# Patient Record
(demographics unavailable — no encounter records)

---

## 2025-02-25 NOTE — ASSESSMENT
[FreeTextEntry1] : Mr. Adin Collier is a 46-year-old hernández  who presents with 3 complaints: 1.  Urinary hesitancy 2.  Sexual dysfunction 3.  Decreased libido.  He has taken that sildenafil in the past with good results regarding his erectile function.  He has undergone endocrinologic studies by Dr. Adin Garcia.  These were reviewed : prolactin 7.3 PSA 1.34 creat 0.77  free T 64.2 LH 3.3 FSH 3.7 MRI ? cyst vs small pituitary microadenoma NEEDS ENT consult  His urine flow rate demonstrates a flow of 14 cc/s with a good shaped curve.  His voided volume was 150 cc.  He had no significant residual.  We discussed the impact of erectile dysfunction on libido.  We discussed the physiology of libido.  We discussed the fact that he responded well to sildenafil in the past and therefore treating his urinary symptoms and his sexual symptoms with daily tadalafil (2.5 mg) and address his 2 primary concerns.  We discussed the risks and side effects of Cialis including priapism. Warned re priapism risk and need for immediate intervention if erection lasts more than 2 hours.  Patient instructed to report to an emergency room and explicitly inform staff of his condition  and need for treatment.  We discussed his posthitis.  He is not interested in circumcision.  We discussed conservative management.  He was provided with nystatin cream to utilize on a as needed basis.  Lastly his MRI report was reviewed.  His prolactin level was normal in spite of the question of a question microadenoma versus a cyst.  Also of note was the fact that he had tonsillar thickening and ENT evaluation was recommended.  I emphasized the importance of this.  I instructed him to discuss this with Dr. Garcia for recommendation. The ADIN COLLIER  expressed fully understanding of the information provided, the consequences and the management.  Plan: 1. tadalafil 2.5 mg daily 2. fu with Dr Jose grajeda ENT recommendation 3. fu in 6 weeks  1.31.23 continues tadalafil 2.5mg with improved erections continues to have hesitancy, not bothersome no side effects  did not follow up with Dr. Jose grajeda ENT tonsillar thickening continues to have posthitis  IPSS 8 LETHA 18  discussed increasing tadalafil dose to 5 mg, but not necessary in this setting discussed staying course 2.5 mg to avoid side effects discussed foreskin hygiene patient not willing to proceed with circumcision discussed importance of ENT referral endocrinology noted no significant findings  plan: 1. continue tadalafil 2.5mg 2. continue nystatin 2. refer to Dr. Rai Magaña ENT 3. f/u in 6 months with flow/PVR  8.8.2023 tadalafil 2.5 mg, improved erections hesitancy resolved evaluated by Dr. Magaña, normal exam IPSS 4 LETHA 22  reviewed labs in November 2022 normal endocrine labs continues to not be interested in circumcision discussed repeating prolactin to reassess pituitary abnormality  plan: continue tadalafil 2.5 mg prolactin f/u in 6 months  2.6.2024 s/p ENT evaluation history of posthitis responding to tadalafil 2.5 mg IPSS 7 - pleased ED responding  to tadalafil LETHA 22 Plan: renewal of tadalafil 2.5 mg followup in 6 months  8.13.2024 Patient returns on tadalafil 2.5 mg.  He states his erections are "okay".  His urinary hesitancy has returned.  His postvoid residual was 38 cc.  His flow was nondiagnostic.  We discussed increasing the dose of tadalafil to 5 mg versus continuing at 2.5 mg.  Patient prefers to increase the dose to 5 mg daily.  We reviewed his previous records.  He did undergo evaluation for the question pituitary abnormality and tonsillar thickening.  Plan: 1.  Tadalafil 5 mg daily 2.  Urinalysis 3.  Prolactin 4.  Follow-up in 6 months or as needed  2.25.2025 complaining of interrupted stream and incomplete difficulty urinating on command Patient continues to complain of feeling of inadequate or incomplete voiding.  His flow was excellent.  However he does have a sawtooth pattern.  His postvoid residual is 94 cc.  We discussed options of: 1.  Pelvic floor rehab for possible pelvic floor dysfunction versus 2.  Stopping tadalafil on a daily basis and replacing it with alfuzosin.  At this point he is infrequently sexually active.  He is not certain that he needs to tadalafil for sexual function.  Plan: 1.  Alfuzosin Pt understands that some of the most common SE of this medication include dizziness, dry mouth, fatigue, lightheadedness, palpitations. Pt informed to stop the medication should any of these occur.  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  Tadalafil as needed for sexual function  (discussed interval between medications) 3.  Follow-up in 1 month with flow and postvoid residual 4.  PSA Discussed PSA monitoring and controversy.  Patient wants to proceed, 5.  Testosterone and prolactin

## 2025-02-25 NOTE — HISTORY OF PRESENT ILLNESS
[FreeTextEntry1] : 46 year old   hernández single sexually active; utilizes condoms referred by Dr Adin Garcia re: 1. decreased libido  2. decreased quality of erection 3. urinary hesitancy  1.31.23 returns re sexual dysfunction tadalafil 2.5 mg good response hesitancy did not see ENT as recommended  8.8.2023 returns re sexual dysfunction tadalafil 2.5 mg good response urinary hesitancy resolved  8.13.2024 returns on tadalafil 2.5 mg occ hesitancy feeling of incomplete emptying flow nondiagnostic no dysuria IPSS 7 LETHA 17  2.25.2025 returns on tadalafil 5 mg LUTS ok  flow not as stong with incomplete emptying at time (frequent) IPSS 12 letha 17

## 2025-02-25 NOTE — PHYSICAL EXAM
[Penis Abnormality] : normal uncircumcised penis [Chaperone Present] : A chaperone was present in the examining room during all aspects of the physical examination [de-identified] : no evidence of posthitis flow 27.3 cc/sec; sawtooth; pvr 94 [FreeTextEntry2] : Montana Guillen , Rad Tech

## 2025-04-01 NOTE — ASSESSMENT
[FreeTextEntry1] : Mr. Adin Collier is a 46-year-old hernández  who presents with 3 complaints: 1.  Urinary hesitancy 2.  Sexual dysfunction 3.  Decreased libido.  He has taken that sildenafil in the past with good results regarding his erectile function.  He has undergone endocrinologic studies by Dr. Adin Garcia.  These were reviewed : prolactin 7.3 PSA 1.34 creat 0.77  free T 64.2 LH 3.3 FSH 3.7 MRI ? cyst vs small pituitary microadenoma NEEDS ENT consult  His urine flow rate demonstrates a flow of 14 cc/s with a good shaped curve.  His voided volume was 150 cc.  He had no significant residual.  We discussed the impact of erectile dysfunction on libido.  We discussed the physiology of libido.  We discussed the fact that he responded well to sildenafil in the past and therefore treating his urinary symptoms and his sexual symptoms with daily tadalafil (2.5 mg) and address his 2 primary concerns.  We discussed the risks and side effects of Cialis including priapism. Warned re priapism risk and need for immediate intervention if erection lasts more than 2 hours.  Patient instructed to report to an emergency room and explicitly inform staff of his condition  and need for treatment.  We discussed his posthitis.  He is not interested in circumcision.  We discussed conservative management.  He was provided with nystatin cream to utilize on a as needed basis.  Lastly his MRI report was reviewed.  His prolactin level was normal in spite of the question of a question microadenoma versus a cyst.  Also of note was the fact that he had tonsillar thickening and ENT evaluation was recommended.  I emphasized the importance of this.  I instructed him to discuss this with Dr. Garcia for recommendation. The ADIN COLLIER  expressed fully understanding of the information provided, the consequences and the management.  Plan: 1. tadalafil 2.5 mg daily 2. fu with Dr Jose grajeda ENT recommendation 3. fu in 6 weeks  .23 continues tadalafil 2.5mg with improved erections continues to have hesitancy, not bothersome no side effects  did not follow up with Dr. Jose grajeda ENT tonsillar thickening continues to have posthitis  IPSS 8 LETHA 18  discussed increasing tadalafil dose to 5 mg, but not necessary in this setting discussed staying course 2.5 mg to avoid side effects discussed foreskin hygiene patient not willing to proceed with circumcision discussed importance of ENT referral endocrinology noted no significant findings  plan: 1. continue tadalafil 2.5mg 2. continue nystatin 2. refer to Dr. Rai Magaña ENT 3. f/u in 6 months with flow/PVR  8. tadalafil 2.5 mg, improved erections hesitancy resolved evaluated by Dr. Magaña, normal exam IPSS 4 LETHA 22  reviewed labs in 2022 normal endocrine labs continues to not be interested in circumcision discussed repeating prolactin to reassess pituitary abnormality  plan: continue tadalafil 2.5 mg prolactin f/u in 6 months  2. s/p ENT evaluation history of posthitis responding to tadalafil 2.5 mg IPSS 7 - pleased ED responding  to tadalafil LETHA 22 Plan: renewal of tadalafil 2.5 mg followup in 6 months  8. Patient returns on tadalafil 2.5 mg.  He states his erections are "okay".  His urinary hesitancy has returned.  His postvoid residual was 38 cc.  His flow was nondiagnostic.  We discussed increasing the dose of tadalafil to 5 mg versus continuing at 2.5 mg.  Patient prefers to increase the dose to 5 mg daily.  We reviewed his previous records.  He did undergo evaluation for the question pituitary abnormality and tonsillar thickening.  Plan: 1.  Tadalafil 5 mg daily 2.  Urinalysis 3.  Prolactin 4.  Follow-up in 6 months or as needed  2. complaining of interrupted stream and incomplete difficulty urinating on command Patient continues to complain of feeling of inadequate or incomplete voiding.  His flow was excellent.  However he does have a sawtooth pattern.  His postvoid residual is 94 cc.  We discussed options of: 1.  Pelvic floor rehab for possible pelvic floor dysfunction versus 2.  Stopping tadalafil on a daily basis and replacing it with alfuzosin.  At this point he is infrequently sexually active.  He is not certain that he needs to tadalafil for sexual function.  Plan: 1.  Alfuzosin Pt understands that some of the most common SE of this medication include dizziness, dry mouth, fatigue, lightheadedness, palpitations. Pt informed to stop the medication should any of these occur.  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  Tadalafil as needed for sexual function  (discussed interval between medications) 3.  Follow-up in 1 month with flow and postvoid residual 4.  PSA Discussed PSA monitoring and controversy.  Patient wants to proceed, 5.  Testosterone and prolactin  4.1. PSA 1.35 discussed value and need to monitor repeat in 6 months  T 380 urinalysis normal repeat T this Am  LUTS no change on alfuzosin flow 152.; volume 432.5  Saw tooth pattern  PVR 81 cc I discussed with ADIN COLLIER the possible indications for treatment of prostatic obstruction includin urinary symptoms and quality of life issues 2.  Obstruction and urinary retention 3.  Infections 4.  Bladder stones 5.  Upper tract changes including hydronephrosis and renal dysfunction We discussed the indications in his case. Discussed risk and complications of cystoscopy  Plan: 1. testosterone 2. cystoscopy r/o stricture 3. continue uroxatal

## 2025-04-22 NOTE — ASSESSMENT
[FreeTextEntry1] : Mr. Adin Collier is a 46-year-old hernández  who presents with 3 complaints: 1.  Urinary hesitancy 2.  Sexual dysfunction 3.  Decreased libido.  He has taken that sildenafil in the past with good results regarding his erectile function.  He has undergone endocrinologic studies by Dr. Adin Garcia.  These were reviewed : prolactin 7.3 PSA 1.34 creat 0.77  free T 64.2 LH 3.3 FSH 3.7 MRI ? cyst vs small pituitary microadenoma NEEDS ENT consult  His urine flow rate demonstrates a flow of 14 cc/s with a good shaped curve.  His voided volume was 150 cc.  He had no significant residual.  We discussed the impact of erectile dysfunction on libido.  We discussed the physiology of libido.  We discussed the fact that he responded well to sildenafil in the past and therefore treating his urinary symptoms and his sexual symptoms with daily tadalafil (2.5 mg) and address his 2 primary concerns.  We discussed the risks and side effects of Cialis including priapism. Warned re priapism risk and need for immediate intervention if erection lasts more than 2 hours.  Patient instructed to report to an emergency room and explicitly inform staff of his condition  and need for treatment.  We discussed his posthitis.  He is not interested in circumcision.  We discussed conservative management.  He was provided with nystatin cream to utilize on a as needed basis.  Lastly his MRI report was reviewed.  His prolactin level was normal in spite of the question of a question microadenoma versus a cyst.  Also of note was the fact that he had tonsillar thickening and ENT evaluation was recommended.  I emphasized the importance of this.  I instructed him to discuss this with Dr. Garcia for recommendation. The ADIN COLLIER  expressed fully understanding of the information provided, the consequences and the management.  Plan: 1. tadalafil 2.5 mg daily 2. fu with Dr Jose grajeda ENT recommendation 3. fu in 6 weeks  .23 continues tadalafil 2.5mg with improved erections continues to have hesitancy, not bothersome no side effects  did not follow up with Dr. Jose grajeda ENT tonsillar thickening continues to have posthitis  IPSS 8 LETHA 18  discussed increasing tadalafil dose to 5 mg, but not necessary in this setting discussed staying course 2.5 mg to avoid side effects discussed foreskin hygiene patient not willing to proceed with circumcision discussed importance of ENT referral endocrinology noted no significant findings  plan: 1. continue tadalafil 2.5mg 2. continue nystatin 2. refer to Dr. Rai Magaña ENT 3. f/u in 6 months with flow/PVR  8. tadalafil 2.5 mg, improved erections hesitancy resolved evaluated by Dr. Magaña, normal exam IPSS 4 LETHA 22  reviewed labs in 2022 normal endocrine labs continues to not be interested in circumcision discussed repeating prolactin to reassess pituitary abnormality  plan: continue tadalafil 2.5 mg prolactin f/u in 6 months  2. s/p ENT evaluation history of posthitis responding to tadalafil 2.5 mg IPSS 7 - pleased ED responding  to tadalafil LETHA 22 Plan: renewal of tadalafil 2.5 mg followup in 6 months  8. Patient returns on tadalafil 2.5 mg.  He states his erections are "okay".  His urinary hesitancy has returned.  His postvoid residual was 38 cc.  His flow was nondiagnostic.  We discussed increasing the dose of tadalafil to 5 mg versus continuing at 2.5 mg.  Patient prefers to increase the dose to 5 mg daily.  We reviewed his previous records.  He did undergo evaluation for the question pituitary abnormality and tonsillar thickening.  Plan: 1.  Tadalafil 5 mg daily 2.  Urinalysis 3.  Prolactin 4.  Follow-up in 6 months or as needed  2. complaining of interrupted stream and incomplete difficulty urinating on command Patient continues to complain of feeling of inadequate or incomplete voiding.  His flow was excellent.  However he does have a sawtooth pattern.  His postvoid residual is 94 cc.  We discussed options of: 1.  Pelvic floor rehab for possible pelvic floor dysfunction versus 2.  Stopping tadalafil on a daily basis and replacing it with alfuzosin.  At this point he is infrequently sexually active.  He is not certain that he needs to tadalafil for sexual function.  Plan: 1.  Alfuzosin Pt understands that some of the most common SE of this medication include dizziness, dry mouth, fatigue, lightheadedness, palpitations. Pt informed to stop the medication should any of these occur.  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  Tadalafil as needed for sexual function  (discussed interval between medications) 3.  Follow-up in 1 month with flow and postvoid residual 4.  PSA Discussed PSA monitoring and controversy.  Patient wants to proceed, 5.  Testosterone and prolactin  4. PSA 1.35 discussed value and need to monitor repeat in 6 months  T 380 urinalysis normal repeat T this Am  LUTS no change on alfuzosin flow 152.; volume 432.5  Saw tooth pattern  PVR 81 cc I discussed with ADIN COLLIER the possible indications for treatment of prostatic obstruction includin urinary symptoms and quality of life issues 2.  Obstruction and urinary retention 3.  Infections 4.  Bladder stones 5.  Upper tract changes including hydronephrosis and renal dysfunction We discussed the indications in his case. Discussed risk and complications of cystoscopy  Plan: 1. testosterone 2. cystoscopy r/o stricture 3. continue uroxatal  4. cystoscopy no stricture; mild bilobar obstrcution continue alfuzosin ? no improvement  felt better on tadalafil 5 mg discussed UDS to assess prostate obtstruction patient wants to restart tadalafil 5 mg  low libido low T on 2 occassions T 194 T 277 prolactin 9.3   We had an extensive discussion re Risks of T replacement; Patient was informed about Black box warning from FDA. We discussed recent data regarding increased risk of coronary plaque size, heart disease; thrombolic event; increased Hbg/Hct, breast tenderness; acne; irritability; sleep apnea and increased urinary symptoms; effect on testicular function including suppression of spermatogenesis; hair loss (male pattern baldness) effect on LFTS; effect on prostate cancer. Different treatment approaches were discussed including IM, transdermal and Testopel We discussed advantages and disadvantages of each We discussed risk to T exposure to partners and children from transdermal appoaches. Patient choses to proceed with transdermal T replacement  Discussed clomid as option I discussed Clomid physiologic basis withADIN COLLIER  We discussed the utilization of Clomid in the treatment of his primary complaint We discussed potential side effects of Clomid  in relation to physiologic effects and adverse effects. We specifically reviewed  acne, hair loss, gynecomastia, visual disturbance, signs and symptoms of "puberty" We discussed that utilization and the fact the this is not an FDA approved utilization. The dose regimen was reviewed. 1/2 tablet qd We discussed the need to monitor the physiologic hormonal response. We discussed repeat labs and followup thereafter. discussed clomid vs T  Plan referral for UDS (Danielle) clomid 25 mg qd labs in 3 weeks

## 2025-04-22 NOTE — HISTORY OF PRESENT ILLNESS
[FreeTextEntry1] : 46 year old   hernández single sexually active; utilizes condoms referred by Dr Adin Garcia re: 1. decreased libido  2. decreased quality of erection 3. urinary hesitancy  1.31.23 returns re sexual dysfunction tadalafil 2.5 mg good response hesitancy did not see ENT as recommended  8.8.2023 returns re sexual dysfunction tadalafil 2.5 mg good response urinary hesitancy resolved  8.13.2024 returns on tadalafil 2.5 mg occ hesitancy feeling of incomplete emptying flow nondiagnostic no dysuria IPSS 7 LETHA 17  2.25.2025 returns on tadalafil 5 mg LUTS ok  flow not as stong with incomplete emptying at time (frequent) IPSS 12 letha 17   4.22.2025 returns for cystscopy returns for management of Low T

## 2025-05-20 NOTE — HISTORY OF PRESENT ILLNESS
[FreeTextEntry1] : 46 year old   hernández single sexually active; utilizes condoms referred by Dr Adin Garcia re: 1. decreased libido  2. decreased quality of erection 3. urinary hesitancy  1.31.23 returns re sexual dysfunction tadalafil 2.5 mg good response hesitancy did not see ENT as recommended  8.8.2023 returns re sexual dysfunction tadalafil 2.5 mg good response urinary hesitancy resolved  8.13.2024 returns on tadalafil 2.5 mg occ hesitancy feeling of incomplete emptying flow nondiagnostic no dysuria IPSS 7 LETHA 17  2.25.2025 returns on tadalafil 5 mg LUTS ok  flow not as stong with incomplete emptying at time (frequent) IPSS 12 letha 17   4.22.2025 returns for cystoscopy returns for management of Low T

## 2025-05-20 NOTE — ASSESSMENT
[FreeTextEntry1] : Mr. Adin Collier is a 46-year-old hernández  who presents with 3 complaints: 1.  Urinary hesitancy 2.  Sexual dysfunction 3.  Decreased libido.  He has taken that sildenafil in the past with good results regarding his erectile function.  He has undergone endocrinologic studies by Dr. Adin Garcia.  These were reviewed : prolactin 7.3 PSA 1.34 creat 0.77  free T 64.2 LH 3.3 FSH 3.7 MRI ? cyst vs small pituitary microadenoma NEEDS ENT consult  His urine flow rate demonstrates a flow of 14 cc/s with a good shaped curve.  His voided volume was 150 cc.  He had no significant residual.  We discussed the impact of erectile dysfunction on libido.  We discussed the physiology of libido.  We discussed the fact that he responded well to sildenafil in the past and therefore treating his urinary symptoms and his sexual symptoms with daily tadalafil (2.5 mg) and address his 2 primary concerns.  We discussed the risks and side effects of Cialis including priapism. Warned re priapism risk and need for immediate intervention if erection lasts more than 2 hours.  Patient instructed to report to an emergency room and explicitly inform staff of his condition  and need for treatment.  We discussed his posthitis.  He is not interested in circumcision.  We discussed conservative management.  He was provided with nystatin cream to utilize on a as needed basis.  Lastly his MRI report was reviewed.  His prolactin level was normal in spite of the question of a question microadenoma versus a cyst.  Also of note was the fact that he had tonsillar thickening and ENT evaluation was recommended.  I emphasized the importance of this.  I instructed him to discuss this with Dr. Garcia for recommendation. The ADIN COLLIER  expressed fully understanding of the information provided, the consequences and the management.  Plan: 1. tadalafil 2.5 mg daily 2. fu with Dr Jose grajeda ENT recommendation 3. fu in 6 weeks  .23 continues tadalafil 2.5mg with improved erections continues to have hesitancy, not bothersome no side effects  did not follow up with Dr. Jose grajeda ENT tonsillar thickening continues to have posthitis  IPSS 8 LETHA 18  discussed increasing tadalafil dose to 5 mg, but not necessary in this setting discussed staying course 2.5 mg to avoid side effects discussed foreskin hygiene patient not willing to proceed with circumcision discussed importance of ENT referral endocrinology noted no significant findings  plan: 1. continue tadalafil 2.5mg 2. continue nystatin 2. refer to Dr. Rai Magaña ENT 3. f/u in 6 months with flow/PVR  8. tadalafil 2.5 mg, improved erections hesitancy resolved evaluated by Dr. Magaña, normal exam IPSS 4 LETHA 22  reviewed labs in 2022 normal endocrine labs continues to not be interested in circumcision discussed repeating prolactin to reassess pituitary abnormality  plan: continue tadalafil 2.5 mg prolactin f/u in 6 months  2. s/p ENT evaluation history of posthitis responding to tadalafil 2.5 mg IPSS 7 - pleased ED responding  to tadalafil LETHA 22 Plan: renewal of tadalafil 2.5 mg followup in 6 months  8. Patient returns on tadalafil 2.5 mg.  He states his erections are "okay".  His urinary hesitancy has returned.  His postvoid residual was 38 cc.  His flow was nondiagnostic.  We discussed increasing the dose of tadalafil to 5 mg versus continuing at 2.5 mg.  Patient prefers to increase the dose to 5 mg daily.  We reviewed his previous records.  He did undergo evaluation for the question pituitary abnormality and tonsillar thickening.  Plan: 1.  Tadalafil 5 mg daily 2.  Urinalysis 3.  Prolactin 4.  Follow-up in 6 months or as needed  2. complaining of interrupted stream and incomplete difficulty urinating on command Patient continues to complain of feeling of inadequate or incomplete voiding.  His flow was excellent.  However he does have a sawtooth pattern.  His postvoid residual is 94 cc.  We discussed options of: 1.  Pelvic floor rehab for possible pelvic floor dysfunction versus 2.  Stopping tadalafil on a daily basis and replacing it with alfuzosin.  At this point he is infrequently sexually active.  He is not certain that he needs to tadalafil for sexual function.  Plan: 1.  Alfuzosin Pt understands that some of the most common SE of this medication include dizziness, dry mouth, fatigue, lightheadedness, palpitations. Pt informed to stop the medication should any of these occur.  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  Tadalafil as needed for sexual function  (discussed interval between medications) 3.  Follow-up in 1 month with flow and postvoid residual 4.  PSA Discussed PSA monitoring and controversy.  Patient wants to proceed, 5.  Testosterone and prolactin  4. PSA 1.35 discussed value and need to monitor repeat in 6 months  T 380 urinalysis normal repeat T this Am  LUTS no change on alfuzosin flow 152.; volume 432.5  Saw tooth pattern  PVR 81 cc I discussed with ADIN COLLIER the possible indications for treatment of prostatic obstruction includin urinary symptoms and quality of life issues 2.  Obstruction and urinary retention 3.  Infections 4.  Bladder stones 5.  Upper tract changes including hydronephrosis and renal dysfunction We discussed the indications in his case. Discussed risk and complications of cystoscopy  Plan: 1. testosterone 2. cystoscopy r/o stricture 3. continue uroxatal  4. cystoscopy no stricture; mild bilobar obstrcution continue alfuzosin ? no improvement  felt better on tadalafil 5 mg discussed UDS to assess prostate obtstruction patient wants to restart tadalafil 5 mg  low libido low T on 2 occassions T 194 T 277 prolactin 9.3   We had an extensive discussion re Risks of T replacement; Patient was informed about Black box warning from FDA. We discussed recent data regarding increased risk of coronary plaque size, heart disease; thrombolic event; increased Hbg/Hct, breast tenderness; acne; irritability; sleep apnea and increased urinary symptoms; effect on testicular function including suppression of spermatogenesis; hair loss (male pattern baldness) effect on LFTS; effect on prostate cancer. Different treatment approaches were discussed including IM, transdermal and Testopel We discussed advantages and disadvantages of each We discussed risk to T exposure to partners and children from transdermal appoaches. Patient choses to proceed with transdermal T replacement  Discussed clomid as option I discussed Clomid physiologic basis withADIN COLLIER  We discussed the utilization of Clomid in the treatment of his primary complaint We discussed potential side effects of Clomid  in relation to physiologic effects and adverse effects. We specifically reviewed  acne, hair loss, gynecomastia, visual disturbance, signs and symptoms of "puberty" We discussed that utilization and the fact the this is not an FDA approved utilization. The dose regimen was reviewed. 1/2 tablet qd We discussed the need to monitor the physiologic hormonal response. We discussed repeat labs and followup thereafter. discussed clomid vs T  Plan referral for UDS (Danielle) clomid 25 mg qd labs in 3 weeks   2025 returns had been on clomid x 30 days then ran out of  no change in sexual function no change in libido continues to take tadalafil  5 mg daily excellent erections  Labs reviewed on T excellent response hormonal discussed continuing on clomid x 3 months clomid 25 mg daily  LUTS off alfuzosin continue tadalafil 5mg patient has not seen Dr Fontana  scheduled for  discussed UDS procedure and indications  The ADIN COLLIER  expressed fully understanding of the information provided, the consequences and the management.  Plan: resume clomid labs in 60 days assess symptomatic response re energy /libido etc followup Dr Fontana re HAMMONDS vs OAB continue daily tadalafil The ADIN COLLIER  expressed fully understanding of the information provided, the consequences and the management.

## 2025-06-22 NOTE — HISTORY OF PRESENT ILLNESS
[FreeTextEntry1] : 48-year-old gentleman with urinary hesitancy, sensation of incomplete emptying, intermittency, and slow stream who is failed daily tadalafil as well as alpha-blocker therapy. The patient is here today to have urodynamics performed, review results and discuss treatment options. Denies significant change in med/surg history since last office visit.  Cystoscopy with Dr. Nixon revealed mild bilateral lobar obstruction.  UDS -   Filling/Storage Phase: First sensation 19 mL, First desire 96 mL, Normal desire 102 mL and Cystometric capacity 212 mL. Involuntary contractions were not present . Pt did not demonstrate stress urinary incontinence. Pt did not demonstrate urge urinary incontinence. Compliance: normal. EMG Activity: normal.    Voiding Phase: Qmax 8.9 mL/s , Pdet at Qmax 98.7 cm/H20, Qmax at Pdet 6.8 mL/s, Pavg 82 cm/H20, Qavg 5 mL/s, voiding time 59.6 mm:sec, voided volume 265.4 mL and post void residual 0 mL. EMG activity was synergic.   Additional Comments: BOOI - 82.   Urodynamic Interpretation :   Normal bladder sensation. Decreased bladder capacity. Patient experiencing no detrusor instability. The uroflow rate is decreased. The uroflow pattern is plateaued. The detrusor contractility is normal. The patient has bladder outlet obstruction. The intravesical voiding pressure is increased. The patient has complete bladder emptying. The spincter activity  is normal synergic.

## 2025-06-22 NOTE — ASSESSMENT
[FreeTextEntry1] :   Impression/plan: 48-year-old gentleman with LUTS symptoms, not responding to med therapy, fitting the picture of potential primary bladder neck obstruction.  Urodynamics did reveal high pressure voiding with low flow.  UDS reviewed, shows low flow/high pressure, BOOI 82 c/w outlet obstruction. We reviewed the surgical options at length, will be partially dependent on prostate volume. All questions answered.

## 2025-06-22 NOTE — PHYSICAL EXAM
[General Appearance - Well Developed] : well developed [General Appearance - Well Nourished] : well nourished [Normal Appearance] : normal appearance [Well Groomed] : well groomed [] : no respiratory distress [General Appearance - In No Acute Distress] : no acute distress [Respiration, Rhythm And Depth] : normal respiratory rhythm and effort [Exaggerated Use Of Accessory Muscles For Inspiration] : no accessory muscle use [Normal Station and Gait] : the gait and station were normal for the patient's age [Oriented To Time, Place, And Person] : oriented to person, place, and time

## 2025-07-28 NOTE — HISTORY OF PRESENT ILLNESS
[FreeTextEntry1] : 46 year old   hernández single sexually active; utilizes condoms referred by Dr Adin Garcia re: 1. decreased libido  2. decreased quality of erection 3. urinary hesitancy  1.31.23 returns re sexual dysfunction tadalafil 2.5 mg good response hesitancy did not see ENT as recommended  8.8.2023 returns re sexual dysfunction tadalafil 2.5 mg good response urinary hesitancy resolved  8.13.2024 returns on tadalafil 2.5 mg occ hesitancy feeling of incomplete emptying flow nondiagnostic no dysuria IPSS 7 LETHA 17  2.25.2025 returns on tadalafil 5 mg LUTS ok  flow not as strong with incomplete emptying at time (frequent) IPSS 12 letha 17   4.22.2025 returns for cystoscopy returns for management of Low T  7.28.2025 Patient returns after urodynamic evaluation

## 2025-07-28 NOTE — ASSESSMENT
[FreeTextEntry1] : Mr. Adin Collier is a 46-year-old hernández  who presents with 3 complaints: 1.  Urinary hesitancy 2.  Sexual dysfunction 3.  Decreased libido.  He has taken that sildenafil in the past with good results regarding his erectile function.  He has undergone endocrinologic studies by Dr. Adin Garcia.  These were reviewed : prolactin 7.3 PSA 1.34 creat 0.77  free T 64.2 LH 3.3 FSH 3.7 MRI ? cyst vs small pituitary microadenoma NEEDS ENT consult  His urine flow rate demonstrates a flow of 14 cc/s with a good shaped curve.  His voided volume was 150 cc.  He had no significant residual.  We discussed the impact of erectile dysfunction on libido.  We discussed the physiology of libido.  We discussed the fact that he responded well to sildenafil in the past and therefore treating his urinary symptoms and his sexual symptoms with daily tadalafil (2.5 mg) and address his 2 primary concerns.  We discussed the risks and side effects of Cialis including priapism. Warned re priapism risk and need for immediate intervention if erection lasts more than 2 hours.  Patient instructed to report to an emergency room and explicitly inform staff of his condition  and need for treatment.  We discussed his posthitis.  He is not interested in circumcision.  We discussed conservative management.  He was provided with nystatin cream to utilize on a as needed basis.  Lastly his MRI report was reviewed.  His prolactin level was normal in spite of the question of a question microadenoma versus a cyst.  Also of note was the fact that he had tonsillar thickening and ENT evaluation was recommended.  I emphasized the importance of this.  I instructed him to discuss this with Dr. Garcia for recommendation. The ADIN COLLIER  expressed fully understanding of the information provided, the consequences and the management.  Plan: 1. tadalafil 2.5 mg daily 2. fu with Dr Jose grajeda ENT recommendation 3. fu in 6 weeks  .23 continues tadalafil 2.5mg with improved erections continues to have hesitancy, not bothersome no side effects  did not follow up with Dr. Jose grajeda ENT tonsillar thickening continues to have posthitis  IPSS 8 LETHA 18  discussed increasing tadalafil dose to 5 mg, but not necessary in this setting discussed staying course 2.5 mg to avoid side effects discussed foreskin hygiene patient not willing to proceed with circumcision discussed importance of ENT referral endocrinology noted no significant findings  plan: 1. continue tadalafil 2.5mg 2. continue nystatin 2. refer to Dr. Rai Magaña ENT 3. f/u in 6 months with flow/PVR  8. tadalafil 2.5 mg, improved erections hesitancy resolved evaluated by Dr. Magaña, normal exam IPSS 4 LETHA 22  reviewed labs in 2022 normal endocrine labs continues to not be interested in circumcision discussed repeating prolactin to reassess pituitary abnormality  plan: continue tadalafil 2.5 mg prolactin f/u in 6 months  2. s/p ENT evaluation history of posthitis responding to tadalafil 2.5 mg IPSS 7 - pleased ED responding  to tadalafil LEHTA 22 Plan: renewal of tadalafil 2.5 mg followup in 6 months  8. Patient returns on tadalafil 2.5 mg.  He states his erections are "okay".  His urinary hesitancy has returned.  His postvoid residual was 38 cc.  His flow was nondiagnostic.  We discussed increasing the dose of tadalafil to 5 mg versus continuing at 2.5 mg.  Patient prefers to increase the dose to 5 mg daily.  We reviewed his previous records.  He did undergo evaluation for the question pituitary abnormality and tonsillar thickening.  Plan: 1.  Tadalafil 5 mg daily 2.  Urinalysis 3.  Prolactin 4.  Follow-up in 6 months or as needed  2. complaining of interrupted stream and incomplete difficulty urinating on command Patient continues to complain of feeling of inadequate or incomplete voiding.  His flow was excellent.  However he does have a sawtooth pattern.  His postvoid residual is 94 cc.  We discussed options of: 1.  Pelvic floor rehab for possible pelvic floor dysfunction versus 2.  Stopping tadalafil on a daily basis and replacing it with alfuzosin.  At this point he is infrequently sexually active.  He is not certain that he needs to tadalafil for sexual function.  Plan: 1.  Alfuzosin Pt understands that some of the most common SE of this medication include dizziness, dry mouth, fatigue, lightheadedness, palpitations. Pt informed to stop the medication should any of these occur.  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  Tadalafil as needed for sexual function  (discussed interval between medications) 3.  Follow-up in 1 month with flow and postvoid residual 4.  PSA Discussed PSA monitoring and controversy.  Patient wants to proceed, 5.  Testosterone and prolactin  4. PSA 1.35 discussed value and need to monitor repeat in 6 months  T 380 urinalysis normal repeat T this Am  LUTS no change on alfuzosin flow 152.; volume 432.5  Saw tooth pattern  PVR 81 cc I discussed with ADIN COLLIER the possible indications for treatment of prostatic obstruction includin urinary symptoms and quality of life issues 2.  Obstruction and urinary retention 3.  Infections 4.  Bladder stones 5.  Upper tract changes including hydronephrosis and renal dysfunction We discussed the indications in his case. Discussed risk and complications of cystoscopy  Plan: 1. testosterone 2. cystoscopy r/o stricture 3. continue uroxatal  4. cystoscopy no stricture; mild bilobar obstrcution continue alfuzosin ? no improvement  felt better on tadalafil 5 mg discussed UDS to assess prostate obtstruction patient wants to restart tadalafil 5 mg  low libido low T on 2 occassions T 194 T 277 prolactin 9.3   We had an extensive discussion re Risks of T replacement; Patient was informed about Black box warning from FDA. We discussed recent data regarding increased risk of coronary plaque size, heart disease; thrombolic event; increased Hbg/Hct, breast tenderness; acne; irritability; sleep apnea and increased urinary symptoms; effect on testicular function including suppression of spermatogenesis; hair loss (male pattern baldness) effect on LFTS; effect on prostate cancer. Different treatment approaches were discussed including IM, transdermal and Testopel We discussed advantages and disadvantages of each We discussed risk to T exposure to partners and children from transdermal appoaches. Patient choses to proceed with transdermal T replacement  Discussed clomid as option I discussed Clomid physiologic basis withADIN COLLIER  We discussed the utilization of Clomid in the treatment of his primary complaint We discussed potential side effects of Clomid  in relation to physiologic effects and adverse effects. We specifically reviewed  acne, hair loss, gynecomastia, visual disturbance, signs and symptoms of "puberty" We discussed that utilization and the fact the this is not an FDA approved utilization. The dose regimen was reviewed. 1/2 tablet qd We discussed the need to monitor the physiologic hormonal response. We discussed repeat labs and followup thereafter. discussed clomid vs T  Plan referral for UDS (Danielle) clomid 25 mg qd labs in 3 weeks   2025 returns had been on clomid x 30 days then ran out of  no change in sexual function no change in libido continues to take tadalafil  5 mg daily excellent erections  Labs reviewed on T excellent response hormonal discussed continuing on clomid x 3 months clomid 25 mg daily  LUTS off alfuzosin continue tadalafil 5mg patient has not seen Dr Fontana  scheduled for  discussed UDS procedure and indications  The ADIN COLLIER  expressed fully understanding of the information provided, the consequences and the management.  Plan: resume clomid labs in 60 days assess symptomatic response re energy /libido etc followup Dr Fontana re HAMMONDS vs OAB continue daily tadalafil The ADIN COLLIER  expressed fully understanding of the information provided, the consequences and the management.  2025.  Patient returns after urodynamic evaluation.  His IPSS score is 23. He UDS demonstrated bladder outlet obstruction  Low Flow high pressure symptoms reviewed findings HAMMONDS I discussed with ADIN COLLIER the possible indications for treatment of prostatic obstruction includin urinary symptoms and quality of life issues 2.  Obstruction and urinary retention 3.  Infections 4.  Bladder stones 5.  Upper tract changes including hydronephrosis and renal dysfunction We discussed the indications in his case.  We had an extensive discussion about treatment options We discussed surgical and minimally invasive approaches  UROLIFT and [facility] Surgical procedures discussed included transurethral resection of the prostate, open prostatectomy and robot assisted procedures.  Patient unwilling to proceed with intervention Pt understands that some of the most common side effect  of this medication include dizziness, dry mouth, fatigue, lightheadedness, palpitations. Pt informed to stop the medication should any of these occur.  Discussed "retrograde ejaculation" failure of emission from medication. 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 Pt informed to stop the medication should any of these occur.  Discussed "retrograde ejaculation" failure of emission from medication. 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